# Patient Record
Sex: MALE | URBAN - METROPOLITAN AREA
[De-identification: names, ages, dates, MRNs, and addresses within clinical notes are randomized per-mention and may not be internally consistent; named-entity substitution may affect disease eponyms.]

---

## 2021-02-08 ENCOUNTER — OFFICE VISIT (OUTPATIENT)
Dept: URBAN - METROPOLITAN AREA CLINIC 41 | Facility: CLINIC | Age: 56
End: 2021-02-08
Payer: COMMERCIAL

## 2021-02-08 DIAGNOSIS — H43.811 VITREOUS DEGENERATION, RIGHT EYE: ICD-10-CM

## 2021-02-08 DIAGNOSIS — H33.8 OTHER RETINAL DETACHMENTS: Primary | ICD-10-CM

## 2021-02-08 DIAGNOSIS — Z96.1 PRESENCE OF INTRAOCULAR LENS: ICD-10-CM

## 2021-02-08 PROCEDURE — 92014 COMPRE OPH EXAM EST PT 1/>: CPT | Performed by: OPHTHALMOLOGY

## 2021-02-08 PROCEDURE — 92134 CPTRZ OPH DX IMG PST SGM RTA: CPT | Performed by: OPHTHALMOLOGY

## 2021-02-08 ASSESSMENT — INTRAOCULAR PRESSURE
OD: 9
OS: 10

## 2021-02-08 NOTE — IMPRESSION/PLAN
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 s/p PPV, EL, Gas x RD 06/16/19 (SI) Plan: Exam and OCT show retina remains attached. No ERM. Doing well. Great surgical outcome. Patient is pleased. RDW. Patient to call with any new vision changes. Discussed that he should have minimal to no reduction in his visual function as he has healed very well from his RD repair RTC: 12 mo DFE with OCT OU

## 2021-02-08 NOTE — IMPRESSION/PLAN
Impression: Presence of intraocular lens: Z96.1. Plan: Stable. IOLs tailored for near vision. He has had a new Mrx for distance vision recently.

## 2021-02-08 NOTE — IMPRESSION/PLAN
Impression: Vitreous degeneration, right eye: H43.811. Plan: Patient notes floaters. Exam demonstrates a PVD without any RD or RT on exam.  Discussed R/B/A of PPV vs observation for the floaters. The floaters are not debilitating and so observation was recommended. Reassurance provided. RDW discussed. Precautions discussed with the patient.